# Patient Record
(demographics unavailable — no encounter records)

---

## 2024-12-18 NOTE — PLAN
[FreeTextEntry1] : Left inguinal hernia amenable to an open left inguinal hernia pair mesh frees and Shouldice technique.  Surgical options including watchful waiting have been discussed with patient at length.  I reported  that the data shows that watchful waiting may actually eventually convert over [id number] 80% of patients having a surgical intervention.  The best time for surgery is usually during a window of good health.  Surgical options include open, no mesh, with mesh, minimally invasive - meaning laparoscopic or robotic with mesh.  Detailed explanations and a discussion was undertaken with the patient.  Questions have been answered to their satisfaction. Risks, benefits and contraindications were discussed with him at length, including, but not exclusive to bleeding, infection, nerve/vessel injury, surgically transecting the cremasterics including the genital branch of the gentofemoral nerve  which runs in the cremasterics, recurrance rates, chronic pain, sutures used, heart attach, stroke, blood clots, more surgery, death Preoperative labs will be ordered, including cbc, cmp, ekg, in addition to clearance from primary and other specialists as needed.  Preparations and expectations reviewed for preop, perioperative and postoperative discussed in detail, including, pain management, showering, no swimming or bathing for 10 days, follow up in my office in 2-4 weeks, diet recommendations, activity recommendations and return to work.

## 2024-12-18 NOTE — REASON FOR VISIT
[Initial Evaluation] : an initial evaluation [Home] : at home, [unfilled] , at the time of the visit. [Medical Office: ([facility])___] : at the medical office located in  [Patient] : the patient [Self] : self [FreeTextEntry1] : left groin bulge, has had it for some time and has increased in size and discomfort.

## 2024-12-18 NOTE — PLAN
[FreeTextEntry1] : Left inguinal hernia amenable to an open left inguinal hernia pair mesh frees and Shouldice technique.  Surgical options including watchful waiting have been discussed with patient at length.  I reported  that the data shows that watchful waiting may actually eventually convert over [id number] 80% of patients having a surgical intervention.  The best time for surgery is usually during a window of good health.  Surgical options include open, no mesh, with mesh, minimally invasive - meaning laparoscopic or robotic with mesh.  Detailed explanations and a discussion was undertaken with the patient.  Questions have been answered to their satisfaction. Risks, benefits and contraindications were discussed with him at length, including, but not exclusive to bleeding, infection, nerve/vessel injury, surgically transecting the cremasterics including the genital branch of the gentofemoral nerve  which runs in the cremasterics, recurrance rates, chronic pain, sutures used, heart attach, stroke, blood clots, more surgery, death Preoperative labs will be ordered, including cbc, cmp, ekg, in addition to clearance from primary and other specialists as needed.  Preparations and expectations reviewed for preop, perioperative and postoperative discussed in detail, including, pain management, showering, no swimming or bathing for 10 days, follow up in my office in 2-4 weeks, diet recommendations, activity recommendations and return to work.

## 2024-12-18 NOTE — PLAN
[FreeTextEntry1] : Left inguinal hernia amenable to an open left inguinal hernia pair mesh frees and Shouldice technique.  Surgical options including watchful waiting have been discussed with patient at length.  I reported  that the data shows that watchful waiting may actually eventually convert over [id number] 80% of patients having a surgical intervention.  The best time for surgery is usually during a window of good health.  Surgical options include open, no mesh, with mesh, minimally invasive - meaning laparoscopic or robotic with mesh.  Detailed explanations and a discussion was undertaken with the patient.  Questions have been answered to their satisfaction. Risks, benefits and contraindications were discussed with him at length, including, but not exclusive to bleeding, infection, nerve/vessel injury, surgically transecting the cremasterics including the genital branch of the gentofemoral nerve  which runs in the cremasterics, recurrance rates, chronic pain, sutures used, heart attach, stroke, blood clots, more surgery, death Preoperative labs will be ordered, including cbc, cmp, ekg, in addition to clearance from primary and other specialists as needed.  Preparations and expectations reviewed for preop, perioperative and postoperative discussed in detail, including, pain management, showering, no swimming or bathing for 10 days, follow up in my office in 2-4 weeks, diet recommendations, activity recommendations and return to work.

## 2024-12-18 NOTE — REASON FOR VISIT
[Initial Evaluation] : an initial evaluation [Home] : at home, [unfilled] , at the time of the visit. [Medical Office: ([facility])___] : at the medical office located in  [Patient] : the patient [Self] : self [FreeTextEntry1] : left groin bulge, has had it for some time and has increased in size and discomfort.

## 2024-12-18 NOTE — REASON FOR VISIT
[Initial Evaluation] : an initial evaluation [Home] : at home, [unfilled] , at the time of the visit. [Medical Office: ([facility])___] : at the medical office located in  [Patient] : the patient [Self] : self [FreeTextEntry1] : left groin bulge, has had it for some time and has increased in size and discomfort.